# Patient Record
Sex: MALE | Race: WHITE | NOT HISPANIC OR LATINO | Employment: OTHER | URBAN - METROPOLITAN AREA
[De-identification: names, ages, dates, MRNs, and addresses within clinical notes are randomized per-mention and may not be internally consistent; named-entity substitution may affect disease eponyms.]

---

## 2017-05-24 ENCOUNTER — OFFICE VISIT (OUTPATIENT)
Dept: FAMILY MEDICINE CLINIC | Facility: CLINIC | Age: 82
End: 2017-05-24

## 2017-05-24 VITALS
HEART RATE: 64 BPM | TEMPERATURE: 98.4 F | OXYGEN SATURATION: 98 % | HEIGHT: 70 IN | DIASTOLIC BLOOD PRESSURE: 65 MMHG | SYSTOLIC BLOOD PRESSURE: 114 MMHG | BODY MASS INDEX: 29.35 KG/M2 | RESPIRATION RATE: 16 BRPM | WEIGHT: 205 LBS

## 2017-05-24 DIAGNOSIS — R05.9 COUGH: ICD-10-CM

## 2017-05-24 DIAGNOSIS — J06.9 ACUTE URI: Primary | ICD-10-CM

## 2017-05-24 PROCEDURE — 99214 OFFICE O/P EST MOD 30 MIN: CPT | Performed by: FAMILY MEDICINE

## 2017-05-24 PROCEDURE — 71020 XR CHEST PA AND LATERAL: CPT | Performed by: FAMILY MEDICINE

## 2017-05-24 RX ORDER — SULFAMETHOXAZOLE AND TRIMETHOPRIM 800; 160 MG/1; MG/1
1 TABLET ORAL 2 TIMES DAILY
Qty: 20 TABLET | Refills: 0 | Status: SHIPPED | OUTPATIENT
Start: 2017-05-24 | End: 2017-06-06 | Stop reason: SDUPTHER

## 2017-05-24 RX ORDER — DOXYCYCLINE HYCLATE 100 MG/1
CAPSULE ORAL
COMMUNITY
Start: 2017-05-19 | End: 2017-10-31

## 2017-05-30 ENCOUNTER — TELEPHONE (OUTPATIENT)
Dept: FAMILY MEDICINE CLINIC | Facility: CLINIC | Age: 82
End: 2017-05-30

## 2017-06-05 ENCOUNTER — TELEPHONE (OUTPATIENT)
Dept: FAMILY MEDICINE CLINIC | Facility: CLINIC | Age: 82
End: 2017-06-05

## 2017-06-05 DIAGNOSIS — J06.9 ACUTE URI: ICD-10-CM

## 2017-06-05 DIAGNOSIS — R05.9 COUGH: ICD-10-CM

## 2017-06-06 RX ORDER — SULFAMETHOXAZOLE AND TRIMETHOPRIM 800; 160 MG/1; MG/1
1 TABLET ORAL 2 TIMES DAILY
Qty: 20 TABLET | Refills: 0 | Status: SHIPPED | OUTPATIENT
Start: 2017-06-06 | End: 2017-10-31

## 2017-06-22 ENCOUNTER — HOSPITAL ENCOUNTER (EMERGENCY)
Facility: HOSPITAL | Age: 82
Discharge: HOME | End: 2017-06-22
Attending: EMERGENCY MEDICINE | Admitting: EMERGENCY MEDICINE

## 2017-06-22 ENCOUNTER — APPOINTMENT (OUTPATIENT)
Dept: CT IMAGING | Facility: HOSPITAL | Age: 82
End: 2017-06-22

## 2017-06-22 VITALS
RESPIRATION RATE: 16 BRPM | TEMPERATURE: 97.6 F | HEIGHT: 70 IN | DIASTOLIC BLOOD PRESSURE: 63 MMHG | BODY MASS INDEX: 27.92 KG/M2 | WEIGHT: 195 LBS | OXYGEN SATURATION: 95 % | HEART RATE: 72 BPM | SYSTOLIC BLOOD PRESSURE: 130 MMHG

## 2017-06-22 DIAGNOSIS — S00.93XA CONTUSION OF HEAD, UNSPECIFIED PART OF HEAD, INITIAL ENCOUNTER: Primary | ICD-10-CM

## 2017-06-22 DIAGNOSIS — S01.81XA FACIAL LACERATION, INITIAL ENCOUNTER: ICD-10-CM

## 2017-06-22 PROCEDURE — 99283 EMERGENCY DEPT VISIT LOW MDM: CPT

## 2017-06-22 PROCEDURE — 90471 IMMUNIZATION ADMIN: CPT | Performed by: NURSE PRACTITIONER

## 2017-06-22 PROCEDURE — 90715 TDAP VACCINE 7 YRS/> IM: CPT | Performed by: NURSE PRACTITIONER

## 2017-06-22 PROCEDURE — 70450 CT HEAD/BRAIN W/O DYE: CPT

## 2017-06-22 PROCEDURE — 25010000002 TDAP 5-2.5-18.5 LF-MCG/0.5 SUSPENSION: Performed by: NURSE PRACTITIONER

## 2017-06-22 RX ORDER — ASPIRIN 81 MG/1
81 TABLET, CHEWABLE ORAL DAILY
COMMUNITY

## 2017-06-22 RX ORDER — PHENOL 1.4 %
600 AEROSOL, SPRAY (ML) MUCOUS MEMBRANE DAILY
COMMUNITY

## 2017-06-22 RX ORDER — UBIDECARENONE 100 MG
100 CAPSULE ORAL DAILY
COMMUNITY

## 2017-06-22 RX ORDER — LIDOCAINE HYDROCHLORIDE AND EPINEPHRINE 10; 10 MG/ML; UG/ML
20 INJECTION, SOLUTION INFILTRATION; PERINEURAL ONCE
Status: COMPLETED | OUTPATIENT
Start: 2017-06-22 | End: 2017-06-22

## 2017-06-22 RX ORDER — MELATONIN
2000 DAILY
COMMUNITY

## 2017-06-22 RX ADMIN — LIDOCAINE HYDROCHLORIDE AND EPINEPHRINE 20 ML: 10; 10 INJECTION, SOLUTION INFILTRATION; PERINEURAL at 12:01

## 2017-06-22 RX ADMIN — TETANUS TOXOID, REDUCED DIPHTHERIA TOXOID AND ACELLULAR PERTUSSIS VACCINE, ADSORBED 0.5 ML: 5; 2.5; 8; 8; 2.5 SUSPENSION INTRAMUSCULAR at 11:41

## 2017-06-22 NOTE — ED PROVIDER NOTES
History    The patient presents to the ED after a golf ball ricocheted off the cart path and hit him upon the right superior orbit upon impact. The patient sustained a laceration upon impact but he denies sustaining any LOC. He is currently complaining of a mild diffuse HA but he has no other complaints.     On physical exam, AOx3, laceration right eyebrow, periorbital ecchymosis, no bony tenderness, Normal neuro    Negative Head CT    Lac repaired by NP    I supervised care provided by the midlevel provider.  We have discussed this patient's history, physical exam, and treatment plan. I have reviewed the note and personally saw and examined the patient and agree with the plan of care.    Documentation assistance provided by kasey Alvarez for .  Information recorded by the scribe was done at my direction and has been verified and validated by me.       Bienvenido Alvarez  06/22/17 0838       Tim Demarco MD  06/22/17 5267

## 2017-06-22 NOTE — DISCHARGE INSTRUCTIONS
Continue current home medications   Ice to face/eye  Monitor for signs of infection-redness,swelling,drainage, fever, chills  Have stitches removed in 5-7 days  Return to er for fever, chills, signs of infection, pain or any new or worsening symptoms

## 2017-06-22 NOTE — ED PROVIDER NOTES
EMERGENCY DEPARTMENT ENCOUNTER    CHIEF COMPLAINT  Chief Complaint: head injury  History given by: patient, family  History limited by: N/A  Room Number: 04/04  PMD: Dell Molina MD      HPI:  Pt is a 89 y.o. male who presents with head injury that he sustained earlier today. He reports that while he was playing golf, his golf ball bounced off of a cart and struck his head. He currently has mild headache. He also sustained a laceration to the right eyebrow. He denies loss of consciousness, vision changes, focal weakness, numbness, dizziness, confusion, nausea, vomiting, neck pain, and sustaining any other injury. He states that he went to [facility] earlier today and was referred to ED for further evaluation. Tetanus status is unknown. He is on 81mg ASA and no other blood thinner. Past Medical History of prostate cancer (recently finished radiation therapy), GERD, and HTN.     Duration: occurred earlier today  Timing: constant  Location: head  Radiation: none  Quality: pain  Intensity/Severity: moderate  Progression: unchanged  Associated Symptoms: mild headache, right eyebrow laceration  Aggravating Factors: none  Alleviating Factors: none  Previous Episodes: none  Treatment before arrival: Pt states that he went to [facility] earlier today and was referred to ED for further evaluation.     PAST MEDICAL HISTORY  Active Ambulatory Problems     Diagnosis Date Noted   • Bronchitis 11/11/2015   • Cough 11/11/2015   • Allergic urticaria    • Cellulitis of fifth finger, right    • Hypertension      Resolved Ambulatory Problems     Diagnosis Date Noted   • No Resolved Ambulatory Problems     Past Medical History:   Diagnosis Date   • Allergic urticaria    • Cellulitis of fifth finger, right    • Eye exam, routine 2015   • GERD (gastroesophageal reflux disease)    • Hx of bone density study 11/23/2015   • Hyperlipidemia    • Hypertension    • Macular degeneration (senile) of retina    • Screening for prostate cancer 2012       PAST  SURGICAL HISTORY  Past Surgical History:   Procedure Laterality Date   • COLON BIOPSY  12/2011   • COLONOSCOPY  12/14/2011   • CYST REMOVAL      from finger   • FRACTURE SURGERY Left     broken leg   • LUNG BIOPSY     • TONSILLECTOMY         FAMILY HISTORY  Family History   Problem Relation Age of Onset   • Cancer Father      prostate   • Cancer Brother      lung       SOCIAL HISTORY  Social History     Social History   • Marital status:      Spouse name: N/A   • Number of children: N/A   • Years of education: N/A     Occupational History   • Not on file.     Social History Main Topics   • Smoking status: Former Smoker   • Smokeless tobacco: Never Used   • Alcohol use Yes      Comment: moderate amt   • Drug use: No   • Sexual activity: Not on file     Other Topics Concern   • Not on file     Social History Narrative         ALLERGIES  Cephalosporins; Erythromycin; Keflex [cephalexin]; Levofloxacin; Penicillins; and Pneumococcal vaccines    REVIEW OF SYSTEMS  Review of Systems   Constitutional: Negative for chills and fever.   HENT: Negative for sore throat.    Eyes: Negative for visual disturbance.   Gastrointestinal: Negative for nausea and vomiting.   Genitourinary: Negative for dysuria.   Musculoskeletal: Negative for back pain and neck pain.   Skin: Positive for wound (right eyebrow laceration). Negative for rash.   Neurological: Positive for headaches (mild headache). Negative for dizziness, weakness and numbness.   Psychiatric/Behavioral: The patient is not nervous/anxious.        PHYSICAL EXAM  ED Triage Vitals   Temp Heart Rate Resp BP SpO2   06/22/17 1019 06/22/17 1019 06/22/17 1019 06/22/17 1053 06/22/17 1019   97.6 °F (36.4 °C) 66 18 130/63 95 % WNL       Physical Exam   Constitutional: He is oriented to person, place, and time and well-developed, well-nourished, and in no distress. No distress.   HENT:   Head: Normocephalic.   Mouth/Throat: Mucous membranes are normal.   Bruising around right eye    Eyes: EOM are normal. Pupils are equal, round, and reactive to light. No scleral icterus.   No EOM entrapment   Neck: Normal range of motion.   No c-spine tenderness   Cardiovascular: Normal rate, regular rhythm and normal heart sounds.    Pulmonary/Chest: Effort normal and breath sounds normal. No respiratory distress.   Musculoskeletal: Normal range of motion.   Neurological: He is alert and oriented to person, place, and time. He has normal motor skills and normal sensation.   No focal neuro deficits   Skin: Skin is warm and dry. Laceration (2cm laceration to right eyebrow) noted.   Psychiatric: Mood and affect normal.   Nursing note and vitals reviewed.      RADIOLOGY  CT Head Without Contrast      mpression:      1. Mild small vessel disease in the frontal periventricular white matter  and there is mild diffuse cerebral atrophy most pronounced in the  frontotemporal regions.  There are scattered calcified atherosclerotic  plaques in the cavernous segments of internal carotid arteries  bilaterally.  2. There is a small scalp hematoma and laceration overlying the anterior  inferior lateral right frontal bone in the right supraorbital scalp from  today's head trauma. The remainder of the head CT is within normal  limits with no acute skull fracture or intracranial hemorrhage  identified. Results were communicated to Thi Gutierrez, nurse  practitioner in the ER, taking care of the patient by telephone  06/22/2017 at 1145 AM.          CT head- no acute process, no fracture, no hemorrhage    I ordered the above noted radiological studies and reviewed the images on the PACS system.  Spoke with Dr. Mratínez (radiologist) regarding CT scan results        PROCEDURES    Laceration Repair  Date/Time: 6/22/2017 12:00 PM  Performed by: NICK GUTIERREZ  Authorized by: MEDARDO WALDRON   Consent: Verbal consent obtained.  Risks and benefits: risks, benefits and alternatives were discussed  Consent given by:  patient  Patient understanding: patient states understanding of the procedure being performed  Patient consent: the patient's understanding of the procedure matches consent given  Required items: required blood products, implants, devices, and special equipment available  Patient identity confirmed: verbally with patient and arm band  Body area: head/neck  Location details: right eyebrow  Laceration length: 2 cm  Contaminated: none.  Foreign bodies: no foreign bodies  Tendon involvement: none  Nerve involvement: none  Vascular damage: no  Anesthesia: local infiltration    Anesthesia:  Anesthesia: local infiltration  Local Anesthetic: lidocaine 1% with epinephrine   Anesthetic total: 2.5 mL  Sedation:  Patient sedated: no    Preparation: Patient was prepped and draped in the usual sterile fashion.  Irrigation solution: saline  Irrigation method: syringe  Amount of cleaning: standard  Debridement: none  Degree of undermining: none  Skin closure: 6-0 nylon  Number of sutures: 3  Technique: simple (interrupted)  Approximation: close  Approximation difficulty: simple  Dressing: antibiotic ointment and 4x4 sterile gauze  Patient tolerance: Patient tolerated the procedure well with no immediate complications            PROGRESS AND CONSULTS  11:04 AM- Ordered tdap since tetanus status is unknown.   12:00 PM- Rechecked pt. He is resting comfortably and is in no acute distress. Performed laceration repair. See procedure note for further details.  12:10 PM- Reviewed implications of results (including nothing acute indicated on CT head), diagnosis, meds, responsibility to follow up, warning signs and symptoms of possible worsening, potential complications and reasons to return to ER with patient and family.  Discussed all results and noted any abnormalities with patient and family.  Discussed absolute need to recheck abnormalities and condition with PMD. Instructed pt to keep laceration dry for 24 hours, then to clean site  "daily with soap and water, to apply antibiotic ointment, to monitor for signs of infection (including redness, drainage, swelling, and fevers), and to have sutures removed in 5 to 7 days. Advised pt to apply ice to face. Provided pt and family with head injury precautions.   Discussed plan for discharge, as there is no emergent indication for admission.  Pt and family are agreeable and understand need for follow up and repeat testing. They are aware that discharge does not mean that nothing is wrong but it indicates no emergency is present.  Pt is discharged with instructions to follow up with primary care doctor to have their blood pressure rechecked.   12:13 PM- Reviewed pt's history and workup with Dr. Demarco.  At bedside evaluation, they agree with the plan of care.      DIAGNOSIS  Final diagnoses:   Contusion of head, unspecified part of head, initial encounter   Facial laceration, initial encounter       FOLLOW UP   Dell Molina MD  [address] [address]  [phone]    Schedule an appointment as soon as possible for a visit        COURSE & MEDICAL DECISION MAKING  Pertinent Imaging studies that were ordered and reviewed are noted above.  Results were reviewed/discussed with the patient and family and they were also made aware of online assess.       MEDICATIONS GIVEN IN ER  Medications   Tdap (BOOSTRIX) injection 0.5 mL (0.5 mL Intramuscular Given 6/22/17 1141)   lidocaine-EPINEPHrine (XYLOCAINE W/EPI) 1 %-1:[id number] injection 20 mL (20 mL Injection Given by Other 6/22/17 1201)       /63  Pulse 72  Temp 97.6 °F (36.4 °C) (Tympanic)   Resp 16  Ht 70\" (177.8 cm)  Wt 195 lb (88.5 kg)  SpO2 95%  BMI 27.98 kg/m2      I personally reviewed the past medical history, past surgical history, social history, family history, current medications and allergies as they appear in this chart.  The scribe's note accurately reflects the work and decisions made by me.     I personally " scribed for TATUM Jeffrey on 6/22/2017 at 12:25 PM.  Electronically signed by Ju Lyman on 6/22/2017 at time 12:25 PM     Ju Lyman  06/22/17 1228       Ashlie Gutierrez, MAGEN  06/22/17 [id number]

## 2017-06-23 ENCOUNTER — TELEPHONE (OUTPATIENT)
Dept: SOCIAL WORK | Facility: HOSPITAL | Age: 82
End: 2017-06-23

## 2017-06-23 NOTE — TELEPHONE ENCOUNTER
ER F/U phone call:   Pt states that he is doing well. No other questions or concerns voiced at this time. Eve Lui RN

## 2017-06-29 ENCOUNTER — OFFICE VISIT (OUTPATIENT)
Dept: FAMILY MEDICINE CLINIC | Facility: CLINIC | Age: 82
End: 2017-06-29

## 2017-06-29 VITALS
HEIGHT: 70 IN | SYSTOLIC BLOOD PRESSURE: 134 MMHG | HEART RATE: 52 BPM | RESPIRATION RATE: 16 BRPM | OXYGEN SATURATION: 98 % | WEIGHT: 205 LBS | TEMPERATURE: 97.7 F | BODY MASS INDEX: 29.35 KG/M2 | DIASTOLIC BLOOD PRESSURE: 70 MMHG

## 2017-06-29 DIAGNOSIS — Z09 HOSPITAL DISCHARGE FOLLOW-UP: ICD-10-CM

## 2017-06-29 DIAGNOSIS — Z48.02 VISIT FOR SUTURE REMOVAL: Primary | ICD-10-CM

## 2017-06-29 PROCEDURE — 99214 OFFICE O/P EST MOD 30 MIN: CPT | Performed by: NURSE PRACTITIONER

## 2017-06-30 NOTE — PROGRESS NOTES
Subjective   Lavon Navarro is a 89 y.o. male.     History of Present Illness   Lavon Navarro 89 y.o. male presents today for Emergency Room follow up.  he was treated at [facility] for laceration to right eyebrow.  I reviewed all of the labs and diagnostic testing.  The patient's medications were not changed:  he does not have a follow up appointment with a specialist:    Hospital note as follows:     HPI:  Pt is a 89 y.o. male who presents with head injury that he sustained earlier today. He reports that while he was playing golf, his golf ball bounced off of a cart and struck his head. He currently has mild headache. He also sustained a laceration to the right eyebrow. He denies loss of consciousness, vision changes, focal weakness, numbness, dizziness, confusion, nausea, vomiting, neck pain, and sustaining any other injury. He states that he went to [facility] earlier today and was referred to ED for further evaluation. Tetanus status is unknown. He is on 81mg ASA and no other blood thinner. Past Medical History of prostate cancer (recently finished radiation therapy), GERD, and HTN. 11:04 AM- Ordered tdap since tetanus status is unknown.   12:00 PM- Rechecked pt. He is resting comfortably and is in no acute distress. Performed laceration repair. See procedure note for further details.  12:10 PM- Reviewed implications of results (including nothing acute indicated on CT head), diagnosis, meds, responsibility to follow up, warning signs and symptoms of possible worsening, potential complications and reasons to return to ER with patient and family. Discussed all results and noted any abnormalities with patient and family. Discussed absolute need to recheck abnormalities and condition with PMD. Instructed pt to keep laceration dry for 24 hours, then to clean site daily with soap and water, to apply antibiotic ointment, to monitor for signs of infection (including redness, drainage, swelling, and fevers), and to have  sutures removed in 5 to 7 days. Advised pt to apply ice to face. Provided pt and family with head injury precautions.   Discussed plan for discharge, as there is no emergent indication for admission. Pt and family are agreeable and understand need for follow up and repeat testing. They are aware that discharge does not mean that nothing is wrong but it indicates no emergency is present. Pt is discharged with instructions to follow up with primary care doctor to have their blood pressure rechecked.   12:13 PM- Reviewed pt's history and workup with Dr. Demarco. At bedside evaluation, they agree with the plan of care.    Patient presents today for suture removal.    The following portions of the patient's history were reviewed and updated as appropriate: allergies, current medications, past family history, past medical history, past social history, past surgical history and problem list.    Review of Systems   Constitutional: Negative for chills and fever.   Eyes: Negative for visual disturbance.   Skin: Positive for wound. Negative for color change.   Neurological: Negative for dizziness, light-headedness and headaches.       Objective   Physical Exam   Constitutional: He is oriented to person, place, and time. He appears well-developed and well-nourished.   Eyes: Conjunctivae, EOM and lids are normal. Pupils are equal, round, and reactive to light.       2 cm laceration to right eyebrow.  Wound edges well approximated.  No drainage noted.    Pulmonary/Chest: Effort normal.   Neurological: He is oriented to person, place, and time.   Skin: Skin is warm and dry.   Psychiatric: He has a normal mood and affect. His behavior is normal. Judgment and thought content normal.   Nursing note and vitals reviewed.      Assessment/Plan   Lavon was seen today for suture / staple removal.    Diagnoses and all orders for this visit:    Visit for suture removal    Hospital discharge follow-up        3 sutures removed.       Hospital  records reviewed with pt confirming HPI.

## 2017-09-25 ENCOUNTER — TELEPHONE (OUTPATIENT)
Dept: FAMILY MEDICINE CLINIC | Facility: CLINIC | Age: 82
End: 2017-09-25

## 2017-10-31 ENCOUNTER — OFFICE VISIT (OUTPATIENT)
Dept: SURGERY | Facility: CLINIC | Age: 82
End: 2017-10-31

## 2017-10-31 VITALS
TEMPERATURE: 98.1 F | WEIGHT: 207.4 LBS | BODY MASS INDEX: 29.69 KG/M2 | DIASTOLIC BLOOD PRESSURE: 60 MMHG | SYSTOLIC BLOOD PRESSURE: 120 MMHG | HEIGHT: 70 IN | HEART RATE: 68 BPM | OXYGEN SATURATION: 96 %

## 2017-10-31 DIAGNOSIS — K62.5 RECTUM BLEEDING: Primary | ICD-10-CM

## 2017-10-31 DIAGNOSIS — D50.9 IRON DEFICIENCY ANEMIA, UNSPECIFIED IRON DEFICIENCY ANEMIA TYPE: ICD-10-CM

## 2017-10-31 PROCEDURE — 99212 OFFICE O/P EST SF 10 MIN: CPT | Performed by: COLON & RECTAL SURGERY

## 2017-10-31 RX ORDER — SODIUM CHLORIDE, SODIUM LACTATE, POTASSIUM CHLORIDE, CALCIUM CHLORIDE 600; 310; 30; 20 MG/100ML; MG/100ML; MG/100ML; MG/100ML
30 INJECTION, SOLUTION INTRAVENOUS CONTINUOUS
Status: CANCELLED | OUTPATIENT
Start: 2017-11-09

## 2017-11-09 ENCOUNTER — HOSPITAL ENCOUNTER (OUTPATIENT)
Facility: HOSPITAL | Age: 82
Setting detail: HOSPITAL OUTPATIENT SURGERY
Discharge: HOME | End: 2017-11-09
Attending: COLON & RECTAL SURGERY | Admitting: COLON & RECTAL SURGERY

## 2017-11-09 ENCOUNTER — ANESTHESIA EVENT (OUTPATIENT)
Dept: GASTROENTEROLOGY | Facility: HOSPITAL | Age: 82
End: 2017-11-09

## 2017-11-09 ENCOUNTER — ANESTHESIA (OUTPATIENT)
Dept: GASTROENTEROLOGY | Facility: HOSPITAL | Age: 82
End: 2017-11-09

## 2017-11-09 VITALS
OXYGEN SATURATION: 98 % | HEART RATE: 55 BPM | SYSTOLIC BLOOD PRESSURE: 101 MMHG | DIASTOLIC BLOOD PRESSURE: 57 MMHG | BODY MASS INDEX: 28.77 KG/M2 | HEIGHT: 70 IN | WEIGHT: 201 LBS | RESPIRATION RATE: 16 BRPM | TEMPERATURE: 98 F

## 2017-11-09 DIAGNOSIS — K62.5 RECTUM BLEEDING: ICD-10-CM

## 2017-11-09 PROCEDURE — 45385 COLONOSCOPY W/LESION REMOVAL: CPT | Performed by: COLON & RECTAL SURGERY

## 2017-11-09 PROCEDURE — 45380 COLONOSCOPY AND BIOPSY: CPT | Performed by: COLON & RECTAL SURGERY

## 2017-11-09 PROCEDURE — 25010000002 FENTANYL CITRATE (PF) 100 MCG/2ML SOLUTION: Performed by: ANESTHESIOLOGY

## 2017-11-09 PROCEDURE — 25010000002 PROPOFOL 10 MG/ML EMULSION: Performed by: ANESTHESIOLOGY

## 2017-11-09 PROCEDURE — 88305 TISSUE EXAM BY PATHOLOGIST: CPT | Performed by: COLON & RECTAL SURGERY

## 2017-11-09 DEVICE — DEV CLIP ENDO RESOLUTION360 CONTRL ROT 235CM: Type: IMPLANTABLE DEVICE | Status: FUNCTIONAL

## 2017-11-09 RX ORDER — PROPOFOL 10 MG/ML
VIAL (ML) INTRAVENOUS AS NEEDED
Status: DISCONTINUED | OUTPATIENT
Start: 2017-11-09 | End: 2017-11-09

## 2017-11-09 RX ORDER — SODIUM CHLORIDE, SODIUM LACTATE, POTASSIUM CHLORIDE, CALCIUM CHLORIDE 600; 310; 30; 20 MG/100ML; MG/100ML; MG/100ML; MG/100ML
30 INJECTION, SOLUTION INTRAVENOUS CONTINUOUS
Status: DISCONTINUED | OUTPATIENT
Start: 2017-11-09 | End: 2017-11-09 | Stop reason: HOSPADM

## 2017-11-09 RX ORDER — FENTANYL CITRATE 50 UG/ML
INJECTION, SOLUTION INTRAMUSCULAR; INTRAVENOUS AS NEEDED
Status: DISCONTINUED | OUTPATIENT
Start: 2017-11-09 | End: 2017-11-09 | Stop reason: SURG

## 2017-11-09 RX ORDER — PROPOFOL 10 MG/ML
VIAL (ML) INTRAVENOUS AS NEEDED
Status: DISCONTINUED | OUTPATIENT
Start: 2017-11-09 | End: 2017-11-09 | Stop reason: SURG

## 2017-11-09 RX ORDER — PROPOFOL 10 MG/ML
VIAL (ML) INTRAVENOUS CONTINUOUS PRN
Status: DISCONTINUED | OUTPATIENT
Start: 2017-11-09 | End: 2017-11-09 | Stop reason: SURG

## 2017-11-09 RX ADMIN — PROPOFOL 160 MG: 10 INJECTION, EMULSION INTRAVENOUS at 10:41

## 2017-11-09 RX ADMIN — SODIUM CHLORIDE, POTASSIUM CHLORIDE, SODIUM LACTATE AND CALCIUM CHLORIDE 30 ML/HR: 600; 310; 30; 20 INJECTION, SOLUTION INTRAVENOUS at 09:46

## 2017-11-09 RX ADMIN — FENTANYL CITRATE 50 MCG: 50 INJECTION INTRAMUSCULAR; INTRAVENOUS at 10:41

## 2017-11-09 RX ADMIN — PROPOFOL 120 MCG/KG/MIN: 10 INJECTION, EMULSION INTRAVENOUS at 10:43

## 2017-11-09 NOTE — H&P (VIEW-ONLY)
"Lavon Navarro is a 89 y.o. male in for follow up of Rectum bleeding    Iron deficiency anemia, unspecified iron deficiency anemia type    Dx with prostate ca.  xrt + bcg finished June   bm hard  occas rb with hard stool.    Tried stool softner fixes rb  Fiber  -met   W/ miralax  Alone,  bm too loose  Tried prep H - helps  At nephrologist and found hgb 9.7  started fe 3x/wk  Just started  slightly low fe level    /60 (BP Location: Left arm, Patient Position: Sitting, Cuff Size: Adult)  Pulse 68  Temp 98.1 °F (36.7 °C) (Oral)   Ht 70\" (177.8 cm)  Wt 207 lb 6.4 oz (94.1 kg)  SpO2 96%  BMI 29.76 kg/m2  Body mass index is 29.76 kg/(m^2).      PE:  Physical Exam   Constitutional: He appears well-developed. No distress.   HENT:   Head: Normocephalic and atraumatic.   Abdominal: Soft. He exhibits no distension.   Musculoskeletal: Normal range of motion.   Neurological: He is alert.   Psychiatric: Thought content normal.         Assessment:   1. Rectum bleeding    2. Iron deficiency anemia, unspecified iron deficiency anemia type         Plan:    Cy            "

## 2017-11-09 NOTE — ANESTHESIA PREPROCEDURE EVALUATION
Anesthesia Evaluation     Patient summary reviewed   no history of anesthetic complications:  NPO Solid Status: > 8 hours  NPO Liquid Status: > 8 hours     Airway   Mallampati: I  TM distance: >3 FB  Neck ROM: full  no difficulty expected  Dental      Pulmonary     breath sounds clear to auscultation  Cardiovascular   Exercise tolerance: good (4-7 METS)    Rhythm: regular  Rate: normal    (+) hypertension, hyperlipidemia      Neuro/Psych  GI/Hepatic/Renal/Endo    (+)  GERD,     Musculoskeletal     Abdominal    Substance History      OB/GYN          Other                                        Anesthesia Plan    ASA 3     MAC   total IV anesthesia  intravenous induction   Anesthetic plan and risks discussed with patient.

## 2017-11-09 NOTE — PLAN OF CARE
Problem: Patient Care Overview (Adult)  Goal: Adult Individualization and Mutuality  Outcome: Ongoing (interventions implemented as appropriate)    11/09/17 0937   Individualization   Patient Specific Interventions denies       Goal: Discharge Needs Assessment    11/09/17 0937   Discharge Needs Assessment   Concerns To Be Addressed no discharge needs identified   Discharge Disposition home or self-care   Self-Care   Equipment Currently Used at Home none         Problem: GI Endoscopy (Adult)  Goal: Signs and Symptoms of Listed Potential Problems Will be Absent or Manageable (GI Endoscopy)  Outcome: Ongoing (interventions implemented as appropriate)    11/09/17 0937   GI Endoscopy   Problems Assessed (GI Endoscopy) pain;bleeding;fluid imbalance;hypoxia/hypoxemia   Problems Present (GI Endoscopy) none

## 2017-11-09 NOTE — ANESTHESIA POSTPROCEDURE EVALUATION
Patient: Lavon Navarro    Procedure Summary     Date Anesthesia Start Anesthesia Stop Room / Location    11/09/17 1039 1113  [facility] /  [facility]       Procedure Diagnosis Surgeon Provider    COLONOSCOPY TO CECUM WITH COLD POLYPECTOMY, HOT SNARE POLYPECTOMY (N/A ) Rectum bleeding  (Rectum bleeding [K62.5]) MD Viridiana Deleon MD          Anesthesia Type: MAC  Last vitals  BP   101/57 (11/09/17 1130)   Temp   36.7 °C (98 °F) (11/09/17 1130)   Pulse   55 (11/09/17 1130)   Resp   16 (11/09/17 1130)     SpO2   98 % (11/09/17 1130)     Post Anesthesia Care and Evaluation    Patient location during evaluation: bedside  Patient participation: complete - patient participated  Level of consciousness: awake  Pain management: adequate  Anesthetic complications: No anesthetic complications    Cardiovascular status: acceptable  Respiratory status: acceptable  Hydration status: acceptable

## 2017-11-09 NOTE — DISCHARGE INSTRUCTIONS
For the next 24 hours patient needs to be with a responsible adult.    For 24 hours DO NOT drive, operate machinery, appliances, drink alcohol, make important decisions or sign legal documents.    Start with a light or bland diet and advance to regular diet as tolerated.    Follow recommendations on procedure report provided by your doctor.    Call Dr Bustos for problems [phone]    Problems may include but not limited to: large amounts of bleeding, trouble breathing, repeated vomiting, severe unrelieved pain, fever or chills.

## 2017-11-10 LAB
CYTO UR: NORMAL
LAB AP CASE REPORT: NORMAL
Lab: NORMAL
PATH REPORT.FINAL DX SPEC: NORMAL
PATH REPORT.GROSS SPEC: NORMAL

## 2018-08-27 ENCOUNTER — OFFICE VISIT (OUTPATIENT)
Dept: FAMILY MEDICINE CLINIC | Facility: CLINIC | Age: 83
End: 2018-08-27

## 2018-08-27 VITALS
OXYGEN SATURATION: 99 % | WEIGHT: 217 LBS | SYSTOLIC BLOOD PRESSURE: 145 MMHG | HEIGHT: 70 IN | HEART RATE: 54 BPM | RESPIRATION RATE: 16 BRPM | BODY MASS INDEX: 31.07 KG/M2 | TEMPERATURE: 97.9 F | DIASTOLIC BLOOD PRESSURE: 66 MMHG

## 2018-08-27 DIAGNOSIS — M70.22 OLECRANON BURSITIS OF LEFT ELBOW: ICD-10-CM

## 2018-08-27 DIAGNOSIS — R26.89 BALANCE PROBLEM: ICD-10-CM

## 2018-08-27 DIAGNOSIS — R42 DIZZINESS: Primary | ICD-10-CM

## 2018-08-27 DIAGNOSIS — E78.5 HYPERLIPIDEMIA, UNSPECIFIED HYPERLIPIDEMIA TYPE: ICD-10-CM

## 2018-08-27 DIAGNOSIS — D50.9 IRON DEFICIENCY ANEMIA, UNSPECIFIED IRON DEFICIENCY ANEMIA TYPE: ICD-10-CM

## 2018-08-27 PROCEDURE — 99214 OFFICE O/P EST MOD 30 MIN: CPT | Performed by: NURSE PRACTITIONER

## 2018-08-27 RX ORDER — PREDNISONE 20 MG/1
20 TABLET ORAL 2 TIMES DAILY
Qty: 14 TABLET | Refills: 0 | Status: SHIPPED | OUTPATIENT
Start: 2018-08-27 | End: 2018-09-03

## 2018-08-27 NOTE — PROGRESS NOTES
Subjective   Lavon Navarro is a 89 y.o. male.     History of Present Illness   Lavon Navarro 89 y.o. male who presents for evaluation of dizziness. The onset of symptoms were a few days ago and are waxing and waning. The attacks occur daily and last several  minutes. Positions that worsen symptoms: standing up and walking. Previous workup/treatments: none. Associated ear symptoms: none. Associated CNS symptoms: none. He denies synscopy, tinnitus, asymmetrical movements, ataxia, blackouts, blindness, parathesis, hearing loss, memory loss, fainting, headache, involuntary movements, sensory loss, motor loss, eye pain, ear pain and ear discharge.   Recent infections: none. Head trauma: denied.  Has had vertigo before but states this is different. He does not feel that the room is spinning, he feels that he has difficulty walking without swaying.  He denies feeling lightheaded or as if he might black out.  He denies ear pressure, congestion or sinus pain.        Patient was found to have some anemia at last visit with nephrologist. He was started on iron and sent for colonoscopy.  He has not had CBC rechecked since.        Patient also reports swelling to left elbow for several days.  He denies injury. States it is not painful, red or warm.          The following portions of the patient's history were reviewed and updated as appropriate: allergies, current medications, past family history, past medical history, past social history, past surgical history and problem list.    Review of Systems   Constitutional: Negative for fatigue.   HENT: Negative for congestion, ear discharge, ear pain, sinus pain, sinus pressure and tinnitus.    Respiratory: Negative for cough and shortness of breath.    Cardiovascular: Negative for chest pain and palpitations.   Musculoskeletal: Positive for joint swelling. Negative for arthralgias.   Skin: Negative for rash.   Neurological: Positive for dizziness. Negative for tremors,  seizures, syncope, facial asymmetry, speech difficulty, light-headedness, numbness and headaches.   Psychiatric/Behavioral: Negative for dysphoric mood and sleep disturbance. The patient is not nervous/anxious.        Objective   Physical Exam   Constitutional: He is oriented to person, place, and time. He appears well-developed and well-nourished.   HENT:   Right Ear: Tympanic membrane, external ear and ear canal normal.   Left Ear: Tympanic membrane, external ear and ear canal normal.   Nose: Right sinus exhibits no maxillary sinus tenderness and no frontal sinus tenderness. Left sinus exhibits no maxillary sinus tenderness and no frontal sinus tenderness.   Mouth/Throat: Uvula is midline and oropharynx is clear and moist.   Eyes: Pupils are equal, round, and reactive to light. Conjunctivae, EOM and lids are normal.   Cardiovascular: Normal rate and regular rhythm.    Pulmonary/Chest: Effort normal and breath sounds normal.   Musculoskeletal:        Left elbow: He exhibits swelling. He exhibits normal range of motion and no laceration. No tenderness found. No radial head, no medial epicondyle, no lateral epicondyle and no olecranon process tenderness noted.   Neurological: He is oriented to person, place, and time.   Skin: Skin is warm and dry.   Psychiatric: He has a normal mood and affect. His behavior is normal. Judgment and thought content normal.   Nursing note and vitals reviewed.      Assessment/Plan   Lavon was seen today for dizziness and mass.    Diagnoses and all orders for this visit:    Dizziness  -     Cancel: CBC & Differential  -     Cancel: Basic metabolic panel  -     Ambulatory Referral to Physical Therapy Evaluate and treat  -     Comprehensive metabolic panel  -     Lipid panel  -     CBC and Differential    Iron deficiency anemia, unspecified iron deficiency anemia type  -     Cancel: CBC & Differential  -     Cancel: Basic metabolic panel  -     Comprehensive metabolic panel  -     Lipid  panel  -     CBC and Differential    Balance problem  -     Ambulatory Referral to Physical Therapy Evaluate and treat    Hyperlipidemia, unspecified hyperlipidemia type  -     Comprehensive metabolic panel  -     Lipid panel  -     CBC and Differential    Olecranon bursitis of left elbow  -     predniSONE (DELTASONE) 20 MG tablet; Take 1 tablet by mouth 2 (Two) Times a Day for 7 days.

## 2018-09-10 ENCOUNTER — TREATMENT (OUTPATIENT)
Dept: PHYSICAL THERAPY | Facility: CLINIC | Age: 83
End: 2018-09-10

## 2018-09-10 DIAGNOSIS — R26.81 UNSTABLE GAIT: ICD-10-CM

## 2018-09-10 DIAGNOSIS — H81.10 BPPV (BENIGN PAROXYSMAL POSITIONAL VERTIGO), UNSPECIFIED LATERALITY: Primary | ICD-10-CM

## 2018-09-10 PROCEDURE — 95992 CANALITH REPOSITIONING PROC: CPT | Performed by: PHYSICAL THERAPIST

## 2018-09-10 PROCEDURE — 97162 PT EVAL MOD COMPLEX 30 MIN: CPT | Performed by: PHYSICAL THERAPIST

## 2018-09-10 NOTE — PROGRESS NOTES
Physical Therapy Initial Evaluation and Plan of Care    Patient: Lavon Navarro   : 1924  Diagnosis/ICD-10 Code:  BPPV (benign paroxysmal positional vertigo), unspecified laterality [H81.10]  Referring practitioner: Brigette Diaz,*    Subjective Evaluation    History of Present Illness  Mechanism of injury: Woke one morning (18) got up to used the bathroom and went to the left side trying to walk to the bathroom.  The pt's daughter wanted him to the ER and took him to [facility] and had a CT scan & MRI.  Clear on both.  Pt reports that he has had no symptoms since.    Pt reports that morning he woke, he got right up out of bed.  He reports no symptoms occurring again.      Was referred by MAGEN Butterfield.    Comorbidities:   Prostrate Cancer      Occupation:  Retired -   Activities:  Play Golf 1x wk  PLOF: Independent  Medical Hx Reviewed.      Quality of life: good    Diagnostic Tests  MRI studies: normal  CT scan: normal             Objective       Assessment & Plan     Assessment  Impairments: activity intolerance and impaired balance  Assessment details: Pt presents to PT with symptoms consistent with BPPV.  Pt would benefit from skilled PT intervention to address the deficits noted.     Prognosis: good  Prognosis details:     SHORT TERM GOALS: Time for Goal: 2 weeks    1.  Pt reports that understand the information about BPPV and the treatment.    LONG TERM GOALS: Time for Goal: 1 months  1.  Pt to report absence of vertigo symptoms with all ADL's, IADL's, household, recreational and community activities, including all motions and positions.   Functional Limitations: moving in bed and reaching overhead  Plan  Therapy options: will be seen for skilled physical therapy services  Other planned modality interventions: BPPV Treatment;  Vestibular Strengthening  Planned therapy interventions: neuromuscular re-education  Frequency: 1-2x.  Duration in weeks:  4  Treatment plan discussed with: patient  Plan details: If no improvement is seen with BPPV is seen, then an appropriate vestibular exercise program will be started.          Manual Therapy:         mins  44522;  Therapeutic Exercise:         mins  37552;     Neuromuscular Michelle:        mins  09942;    Therapeutic Activity:          mins  53095;     Gait Training:           mins  74008;     Ultrasound:          mins  22441;    Electrical Stimulation:         mins  12868 ( );  Dry Needling          mins self-pay  BPPV Correction   10   mins    Timed Treatment:   10   mins   Total Treatment:     60   mins    PT SIGNATURE: YANET Atkins Lic #[id number]    DATE TREATMENT INITIATED: 9-10-18    Medicare Initial Certification  Certification Period: 12-9-18  I certify that the therapy services are furnished while this patient is under my care.  The services outlined above are required by this patient, and will be reviewed every 90 days.     PHYSICIAN: Brigette Diaz APRN      DATE:     Please sign and return via fax to [phone].. Thank you, [facility].

## 2018-09-13 PROCEDURE — G8982 BODY POS GOAL STATUS: HCPCS | Performed by: PHYSICAL THERAPIST

## 2018-09-13 PROCEDURE — G8981 BODY POS CURRENT STATUS: HCPCS | Performed by: PHYSICAL THERAPIST

## 2018-09-17 ENCOUNTER — OFFICE VISIT (OUTPATIENT)
Dept: FAMILY MEDICINE CLINIC | Facility: CLINIC | Age: 83
End: 2018-09-17

## 2018-09-17 ENCOUNTER — TREATMENT (OUTPATIENT)
Dept: PHYSICAL THERAPY | Facility: CLINIC | Age: 83
End: 2018-09-17

## 2018-09-17 VITALS
WEIGHT: 213 LBS | HEART RATE: 57 BPM | HEIGHT: 70 IN | BODY MASS INDEX: 30.49 KG/M2 | SYSTOLIC BLOOD PRESSURE: 98 MMHG | OXYGEN SATURATION: 97 % | TEMPERATURE: 97.8 F | DIASTOLIC BLOOD PRESSURE: 60 MMHG | RESPIRATION RATE: 16 BRPM

## 2018-09-17 DIAGNOSIS — R42 DIZZINESS: ICD-10-CM

## 2018-09-17 DIAGNOSIS — H81.10 BPPV (BENIGN PAROXYSMAL POSITIONAL VERTIGO), UNSPECIFIED LATERALITY: Primary | ICD-10-CM

## 2018-09-17 DIAGNOSIS — R26.81 UNSTABLE GAIT: ICD-10-CM

## 2018-09-17 DIAGNOSIS — I15.8 OTHER SECONDARY HYPERTENSION: ICD-10-CM

## 2018-09-17 DIAGNOSIS — N18.30 CKD (CHRONIC KIDNEY DISEASE) STAGE 3, GFR 30-59 ML/MIN (HCC): Primary | ICD-10-CM

## 2018-09-17 DIAGNOSIS — C61 PROSTATE CANCER (HCC): ICD-10-CM

## 2018-09-17 PROCEDURE — 95992 CANALITH REPOSITIONING PROC: CPT | Performed by: PHYSICAL THERAPIST

## 2018-09-17 PROCEDURE — 99214 OFFICE O/P EST MOD 30 MIN: CPT | Performed by: FAMILY MEDICINE

## 2018-09-17 NOTE — PATIENT INSTRUCTIONS
Exercise 30 minutes most days of the week  Sleep 6-8 hours each night if possible  Low fat, low cholesterol diet   we discussed prescribed medications and how to take them   make sure you get results of any labs/studies ordered today  Low glycemic index diet   See me 3 months  Sooner if needed

## 2018-09-17 NOTE — PROGRESS NOTES
Subjective   Chief Complaint:   Chief Complaint   Patient presents with   • Dizziness     Hospital Follow up         History of Present Illness patient was seen last week by Brigette for vertigo.  He was admitted to [facility] soon thereafter and was admitted for about 2-3 days.  He had a CT scan of his head MRI scan of his head.  His diagnosis on discharge was labyrinthitis and he was sent to physical therapy for treatment.physical therapy helped him.  I reviewed the chart from [facility].  He had a CT scan of his head and an MRI scan of his head which did not show anything acute.  His labs look good.  His blood pressure today is 98/60.  I reviewed his medications.  He has labs that show CK D3.  And a GFR of 39.  And a BUN of 34 and a creatinine 1.84.  He is alert and oriented.  Negative exam.  He follows closely with the renal doctors.  Several tested [facility].  He is given a still continue follow-up with renal and cardiology.  He is alert and oriented.  His medication list.            Lavon Navarro 89 y.o. male who presents today for Medical Management of the below listed issues and medication refills.    ICD-10-CM ICD-9-CM   1. CKD (chronic kidney disease) stage 3, GFR 30-59 ml/min N18.3 585.3   2. Other secondary hypertension I15.8 405.99   3. Prostate cancer (CMS/[facility]) C61 185   4. Dizziness R42 780.4        he has a problem list of   Patient Active Problem List   Diagnosis   • Bronchitis   • Cough   • Allergic urticaria   • Cellulitis of fifth finger, right   • Hypertension   • Rectum bleeding   .  Since the last visit, he has overall felt well.  he has been compliant with   Current Outpatient Prescriptions:   •  aspirin 81 MG chewable tablet, Chew 81 mg Daily., Disp: , Rfl:   •  atenolol (TENORMIN) 25 MG tablet, Take 12.5 mg by mouth daily., Disp: , Rfl:   •  atorvastatin (LIPITOR) 10 MG tablet, Take 20 mg by mouth daily., Disp: , Rfl:   •  calcium carbonate (OS-BARBIE) 600 MG tablet, Take 600 mg  "by mouth Daily., Disp: , Rfl:   •  cholecalciferol (VITAMIN D3) 1000 UNITS tablet, Take 2,000 Units by mouth Daily., Disp: , Rfl:   •  coenzyme Q10 100 MG capsule, Take 100 mg by mouth Daily., Disp: , Rfl:   •  Multiple Vitamins-Minerals (EYE VITAMINS & MINERALS) tablet, Take  by mouth., Disp: , Rfl:   •  Probiotic Product (PROBIOTIC ADVANCED PO), Take  by mouth., Disp: , Rfl: .  he denies medication side effects.    All of the chronic condition(s) listed above are stable w/o issues.    BP 98/60   Pulse 57   Temp 97.8 °F (36.6 °C)   Resp 16   Ht 177.8 cm (70\")   Wt 96.6 kg (213 lb)   SpO2 97%   BMI 30.56 kg/m²     Results for orders placed or performed during the hospital encounter of 11/09/17   Tissue Pathology Exam - Polyp, Large Intestine, Transverse Colon   Result Value Ref Range    Case Report       Surgical Pathology Report                         Case: [id number]                                  Authorizing Provider:  Leelee Bustos MD         Collected:           11/09/2017 10:49 AM          Ordering Location:     [facility]  Received:            11/09/2017 12:33 PM                                 ENDO SUITES                                                                  Pathologist:           Shlomo Pereira MD                                                        Specimen:    Large Intestine, Transverse Colon, TRANSVERSE COLON POLYPS                                 Final Diagnosis       1. 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    ALFONSO/pkm    CPT CODES:  1. 37500        Gross Description       Received in formalin labeled \"large intestine, transverse colon polyps\" is a 0.8 x 0.5 x 0.1 cm aggregate of tan pieces of tissue. The specimens are submitted all in block 1A.  CC/USO/TDJ/th       Microscopic Description       Performed, incorporated in diagnosis.      Embedded Images               The following portions of the patient's " history were reviewed and updated as appropriate: allergies, current medications, past family history, past medical history, past social history, past surgical history and problem list.    Review of Systems   Constitutional: Positive for fatigue. Negative for appetite change and fever.   HENT: Negative for sinus pain and sinus pressure.    Eyes: Negative for visual disturbance.   Respiratory: Negative for chest tightness and shortness of breath.    Cardiovascular: Negative for chest pain.   Gastrointestinal: Positive for abdominal pain.   Genitourinary: Negative for decreased urine volume and urgency.   Musculoskeletal: Negative for arthralgias and back pain.   Skin: Negative for rash.   Neurological: Negative for dizziness, tremors and headaches.   Psychiatric/Behavioral: Negative for confusion.       Objective   Physical Exam   Constitutional: He is oriented to person, place, and time. He appears well-developed and well-nourished. No distress.   HENT:   Head: Normocephalic.   Right Ear: External ear normal.   Left Ear: External ear normal.   Nose: Nose normal.   Mouth/Throat: Oropharynx is clear and moist.   Eyes: Pupils are equal, round, and reactive to light. EOM are normal.   Neck: Normal range of motion. No tracheal deviation present. No thyromegaly present.   Cardiovascular: Normal rate and regular rhythm.    No murmur heard.  Pulmonary/Chest: Effort normal and breath sounds normal. No respiratory distress. He has no wheezes. He has no rales.   Abdominal: Bowel sounds are normal. There is no tenderness.   Musculoskeletal: Normal range of motion. He exhibits no edema.   Lymphadenopathy:     He has no cervical adenopathy.   Neurological: He is alert and oriented to person, place, and time.   Skin: Skin is warm and dry. No rash noted.   Psychiatric: He has a normal mood and affect. His behavior is normal. Judgment and thought content normal.   Nursing note and vitals reviewed.      Assessment/Plan   Lavon was  seen today for dizziness.    Diagnoses and all orders for this visit:    CKD (chronic kidney disease) stage 3, GFR 30-59 ml/min    Other secondary hypertension    Prostate cancer (CMS/HCC)    Dizziness

## 2018-09-17 NOTE — PROGRESS NOTES
Physical Therapy Daily Progress Note  Visit: 2    Lavon Navarro reports: I am still feeling it when I roll over to the right.      Subjective     Objective   See Exercise, Manual, and Modality Logs for complete treatment.       Assessment & Plan     Assessment  Assessment details: The pt's symptoms continue to show (L) side BPPV symptoms.  Rx of the (L) side was performed with some symptoms provoked.      Plan  Plan details: Follow-up in a week, pt is unable to attend anymore this week.                   Manual Therapy:         mins  68827;  Therapeutic Exercise:         mins  57249;     Neuromuscular Michelle:        mins  99844;    Therapeutic Activity:          mins  30796;     Gait Training:           mins  64448;     Ultrasound:          mins  45116;    Electrical Stimulation:         mins  56023 ( );  Dry Needling          mins self-pay  BPPV Correction    15   mins      Timed Treatment:   15   mins   Total Treatment:     35   mins    Shlomo Jackson PT  [place] Lic. # [id number]  Physical Therapist

## 2018-09-26 ENCOUNTER — TREATMENT (OUTPATIENT)
Dept: PHYSICAL THERAPY | Facility: CLINIC | Age: 83
End: 2018-09-26

## 2018-09-26 DIAGNOSIS — R26.81 UNSTABLE GAIT: ICD-10-CM

## 2018-09-26 DIAGNOSIS — H81.10 BPPV (BENIGN PAROXYSMAL POSITIONAL VERTIGO), UNSPECIFIED LATERALITY: Primary | ICD-10-CM

## 2018-09-26 PROCEDURE — 95992 CANALITH REPOSITIONING PROC: CPT | Performed by: PHYSICAL THERAPIST

## 2018-09-26 PROCEDURE — 97112 NEUROMUSCULAR REEDUCATION: CPT | Performed by: PHYSICAL THERAPIST

## 2018-09-26 NOTE — PROGRESS NOTES
Physical Therapy Daily Progress Note  Visit: 3    Lavon Blakeid reports: I felt dizziness when I left last time but overall this week I have felt better.      Subjective     Objective   See Exercise, Manual, and Modality Logs for complete treatment.       Assessment & Plan     Assessment  Assessment details: The pt's nystagmus has shortened to 5 to 10 seconds occurrence but the intensity is still the same, overall showing improvement.  Because of the continued symptoms BPPV on the (L), will start Epley at home.      Plan  Plan details: Follow up in one week.                   Manual Therapy:         mins  76520;  Therapeutic Exercise:         mins  56273;     Neuromuscular Michelle:    15    mins  92601;    Therapeutic Activity:          mins  52788;     Gait Training:           mins  51043;     Ultrasound:          mins  28083;    Electrical Stimulation:         mins  78868 ( );  Dry Needling          mins self-pay  BPPV Correction    10   mins      Timed Treatment:   25   mins   Total Treatment:     45   mins    Shlomo Jackson PT  [place] Lic. # [id number]  Physical Therapist

## 2018-10-04 ENCOUNTER — TREATMENT (OUTPATIENT)
Dept: PHYSICAL THERAPY | Facility: CLINIC | Age: 83
End: 2018-10-04

## 2018-10-04 DIAGNOSIS — H81.10 BPPV (BENIGN PAROXYSMAL POSITIONAL VERTIGO), UNSPECIFIED LATERALITY: Primary | ICD-10-CM

## 2018-10-04 DIAGNOSIS — R26.81 UNSTABLE GAIT: ICD-10-CM

## 2018-10-04 PROCEDURE — G8982 BODY POS GOAL STATUS: HCPCS | Performed by: PHYSICAL THERAPIST

## 2018-10-04 PROCEDURE — 95992 CANALITH REPOSITIONING PROC: CPT | Performed by: PHYSICAL THERAPIST

## 2018-10-04 PROCEDURE — G8981 BODY POS CURRENT STATUS: HCPCS | Performed by: PHYSICAL THERAPIST

## 2018-10-04 PROCEDURE — G8983 BODY POS D/C STATUS: HCPCS | Performed by: PHYSICAL THERAPIST

## 2018-10-04 NOTE — PROGRESS NOTES
Physical Therapy Daily Progress Note / Discharge Note  Initial Visit: 9-10-18    Total Visits: 4    Lavon Navarro reports: I am feeling much better, I actually played 18 holes of golf this week.  I feel like my dizziness is gone.  The pt reports that he feels weak, but that is from the hormones I am on because of the Cancer Rx.     Subjective     Objective   See Exercise, Manual, and Modality Logs for complete treatment.       Assessment & Plan     Assessment  Assessment details: Pt no longer warrants skilled care for vertigo.  The pt has a trace presence of (L) side nystagmus w/ the [place] but no symptoms, much improved from the start of Rx.  The pt is able to do activities that he wants without limitation.        Plan  Plan details: If symptoms return the pt is to return to PT for further Rx.  DC to [facility].                   Manual Therapy:         mins  86341;  Therapeutic Exercise:         mins  76012;     Neuromuscular Michelle:        mins  50099;    Therapeutic Activity:          mins  24890;     Gait Training:           mins  78736;     Ultrasound:          mins  90737;    Electrical Stimulation:         mins  08240 ( );  Dry Needling          mins self-pay  BPPV Correction   15   mins      Timed Treatment:   15   mins   Total Treatment:     35   mins    Shlomo Jackson, PT  [place] Lic. # [id number]  Physical Therapist

## 2020-06-22 ENCOUNTER — TELEPHONE (OUTPATIENT)
Dept: SURGERY | Facility: CLINIC | Age: 85
End: 2020-06-22

## 2020-06-23 NOTE — TELEPHONE ENCOUNTER
Pt says he has been using Miralax Qd and it helps with constipation. Pt says bottle says to use for only 7 days. Told pt ok to use indefinitely.

## 2021-01-13 ENCOUNTER — IMMUNIZATION (OUTPATIENT)
Dept: VACCINE CLINIC | Facility: HOSPITAL | Age: 86
End: 2021-01-13

## 2021-01-13 PROCEDURE — 91300 HC SARSCOV02 VAC 30MCG/0.3ML IM: CPT | Performed by: INTERNAL MEDICINE

## 2021-01-13 PROCEDURE — 0001A: CPT | Performed by: INTERNAL MEDICINE

## 2021-02-03 ENCOUNTER — IMMUNIZATION (OUTPATIENT)
Dept: VACCINE CLINIC | Facility: HOSPITAL | Age: 86
End: 2021-02-03

## 2021-02-03 PROCEDURE — 91300 HC SARSCOV02 VAC 30MCG/0.3ML IM: CPT | Performed by: INTERNAL MEDICINE

## 2021-02-03 PROCEDURE — 0002A: CPT | Performed by: INTERNAL MEDICINE

## 2021-04-26 ENCOUNTER — TELEPHONE (OUTPATIENT)
Dept: FAMILY MEDICINE CLINIC | Facility: CLINIC | Age: 86
End: 2021-04-26

## 2021-04-26 NOTE — TELEPHONE ENCOUNTER
Caller: JIAN    Relationship: PATIENTS DAUGHTER    Best call back number:   OK TO LEAVE A MESSAGE IF THEY DO NOT ANSWER.     What is the best time to reach you: ANYTIME    Who are you requesting to speak with (clinical staff, provider,  specific staff member):     Do you know the name of the person who called:     What was the call regarding: PATIENTS DAUGHTER J[place] AND WANTS TO COME INTO THE OFFICE WITH THE PATIENT TO ADD HERSELF TO THE  [facility] FOR SO THAT SHE CAN HELP WITH HER FATHERS AFFAIRS.  JIAN IS CALLING IN STATING THAT PATIENT HAS SOME OMEPRAZOLE THAT HE WANTS TO TAKE FOR ACID REFLUX.  JIAN STATES THAT HE CAN TAKE THIS FOR 14 DAYS BUT IF HE NEEDS THIS FOR LONGER WHAT SHOULD HE DO.  SHE WANTS TO BE CALLED BACK TO DISCUSS THIS WITH SOMEONE.  PATIENT IS ON OTHER MEDICATIONS AND SHE WANTS TO TALK TO SOMEONE TO SEE WHAT MEDICATIONS WOULD BE GOOD FOR HIM TO TAKE WITH THE OTHER MEDICATIONS THAT HE IS CURRENTLY TAKING.      Do you require a callback: YES

## 2022-06-13 ENCOUNTER — OFFICE (OUTPATIENT)
Dept: URBAN - METROPOLITAN AREA CLINIC 66 | Facility: CLINIC | Age: 87
End: 2022-06-13

## 2022-06-13 VITALS
HEART RATE: 55 BPM | SYSTOLIC BLOOD PRESSURE: 148 MMHG | HEIGHT: 70 IN | WEIGHT: 221 LBS | DIASTOLIC BLOOD PRESSURE: 54 MMHG

## 2022-06-13 DIAGNOSIS — R19.7 DIARRHEA, UNSPECIFIED: ICD-10-CM

## 2022-06-13 DIAGNOSIS — K59.00 CONSTIPATION, UNSPECIFIED: ICD-10-CM

## 2022-06-13 PROCEDURE — 99202 OFFICE O/P NEW SF 15 MIN: CPT | Performed by: NURSE PRACTITIONER

## 2022-10-19 ENCOUNTER — APPOINTMENT (OUTPATIENT)
Dept: CT IMAGING | Facility: HOSPITAL | Age: 87
End: 2022-10-19

## 2022-10-19 ENCOUNTER — HOSPITAL ENCOUNTER (EMERGENCY)
Facility: HOSPITAL | Age: 87
Discharge: HOME | End: 2022-10-19
Attending: EMERGENCY MEDICINE | Admitting: EMERGENCY MEDICINE

## 2022-10-19 VITALS
RESPIRATION RATE: 18 BRPM | HEART RATE: 67 BPM | OXYGEN SATURATION: 97 % | TEMPERATURE: 97.1 F | DIASTOLIC BLOOD PRESSURE: 71 MMHG | SYSTOLIC BLOOD PRESSURE: 132 MMHG

## 2022-10-19 DIAGNOSIS — S61.412A LACERATION OF LEFT HAND WITHOUT FOREIGN BODY, INITIAL ENCOUNTER: ICD-10-CM
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
## 2022-10-19 DIAGNOSIS — S09.90XA CLOSED HEAD INJURY, INITIAL ENCOUNTER: ICD-10-CM

## 2022-10-19 DIAGNOSIS — S51.012A SKIN TEAR OF LEFT ELBOW WITHOUT COMPLICATION, INITIAL ENCOUNTER: Primary | ICD-10-CM

## 2022-10-19 PROCEDURE — 72125 CT NECK SPINE W/O DYE: CPT

## 2022-10-19 PROCEDURE — 99282 EMERGENCY DEPT VISIT SF MDM: CPT

## 2022-10-19 PROCEDURE — 70450 CT HEAD/BRAIN W/O DYE: CPT

## 2022-10-19 RX ADMIN — Medication 5 ML: at 16:38

## 2022-10-19 NOTE — ED PROVIDER NOTES
EMERGENCY DEPARTMENT ENCOUNTER    Room Number:  A02/02  Date of encounter:  10/19/2022  PCP: Dell Molina MD  Historian: Patient      PPE    Patient was placed in face mask in first look. Patient was wearing facemask when I entered the room and throughout our encounter. I wore full protective equipment throughout this patient encounter including a face mask, and gloves. Hand hygiene was performed before donning protective equipment and after removal when leaving the room.        HPI:  Chief Complaint: Fall  A complete HPI/ROS/PMH/PSH/SH/FH are unobtainable due to: Nothing    Context: Lavon Navarro is a 89 y.o. male with a history of hyperlipidemia, HTN, CKD who arrives to the ED via private vehicle from home.  Patient presents with c/o skin tear to lower elbow and laceration to left hand that occurred prior to arrival  Patient states that he was in a chair with wheels, he leaned over to pick a potato chip up when he fell out.  He states he hit his arm on a table that was next to it.  He is unsure if he hit his head however there is bruising noted to his forehead.  Patient states he did not lose consciousness.    Patient denies neck pain, back pain, dizziness, headache.  Patient states that nothing makes the symptoms better and nothing worsens symptoms.    States his tetanus shot is up-to-date      PAST MEDICAL HISTORY  Active Ambulatory Problems     Diagnosis Date Noted   • Bronchitis 11/11/2015   • Cough 11/11/2015   • Allergic urticaria    • Cellulitis of fifth finger, right    • Hypertension    • Rectum bleeding 10/31/2017     Resolved Ambulatory Problems     Diagnosis Date Noted   • No Resolved Ambulatory Problems     Past Medical History:   Diagnosis Date   • Cellulitis of fifth finger, right    • Chronic kidney disease (CKD), stage III (moderate)    • Eye exam, routine 2015   • GERD (gastroesophageal reflux disease)    • Hx of bone density study 11/23/2015   • Hyperlipidemia    • Macular degeneration  (senile) of retina    • Screening for prostate cancer 2012         PAST SURGICAL HISTORY  Past Surgical History:   Procedure Laterality Date   • COLON BIOPSY  12/2011   • COLONOSCOPY  12/14/2011   • COLONOSCOPY N/A 11/9/2017    Procedure: COLONOSCOPY TO CECUM WITH COLD POLYPECTOMY, HOT SNARE POLYPECTOMY, CLIP PLACEMENT X1;  Surgeon: Leelee Bustos MD;  Location: [facility] ENDOSCOPY;  Service:    • CYST REMOVAL      from finger   • FRACTURE SURGERY Left     broken leg   • LUNG BIOPSY     • TONSILLECTOMY           FAMILY HISTORY  Family History   Problem Relation Age of Onset   • Cancer Father         prostate   • Cancer Brother         lung         SOCIAL HISTORY  Social History     Socioeconomic History   • Marital status:    Tobacco Use   • Smoking status: Former   • Smokeless tobacco: Never   Substance and Sexual Activity   • Alcohol use: Yes     Comment: moderate amt   • Drug use: No         ALLERGIES  Cephalosporins, Erythromycin, Keflex [cephalexin], Levofloxacin, Penicillins, and Pneumococcal vaccines        REVIEW OF SYSTEMS  Review of Systems     All systems reviewed and negative except for those discussed in HPI.        PHYSICAL EXAM    ED Triage Vitals   Temp Heart Rate Resp BP SpO2   10/19/22 1509 10/19/22 1509 10/19/22 1509 10/19/22 1637 10/19/22 1509   97.1 °F (36.2 °C) 67 18 143/68 98 %         Physical Exam  Musculoskeletal:        Arms:         Hands:        GENERAL: Well appearing, nontoxic appearing, not distressed  HENT: normocephalic, atraumatic  EYES: no scleral icterus, PERRL  CV: regular rhythm, regular rate, no murmur  RESPIRATORY: normal effort, CTAB  ABDOMEN: soft   MUSCULOSKELETAL: no deformity  NEURO: alert, moves all extremities, follows commands, mental status normal/baseline  SKIN: warm, dry, no rash   Psych: Appropriate mood and affect  Nursing notes and vital signs reviewed      LAB RESULTS  No results found for this or any previous visit (from the past 24 hour(s)).    Ordered  the above labs and independently reviewed the results.      RADIOLOGY  CT Head Without Contrast, CT Cervical Spine Without Contrast    Result Date: 10/19/2022  CT HEAD AND CERVICAL SPINE WITHOUT CONTRAST  HISTORY: Fall out of chair, hit head, headache, neck pain.  COMPARISON: CT head 06/22/2017.   CT HEAD WITHOUT CONTRAST:  FINDINGS: The brain and ventricles are symmetrical. There is no evidence of hemorrhage, hydrocephalus or of abnormal extra-axial fluid. No focal area of decreased attenuation to suggest acute infarction is identified. Areas of decreased attenuation involving the white matter of the hemispheres are noted bilaterally consistent with mild small vessel ischemic disease, unchanged. Bone windows showed no evidence of a calvarial fracture. Moderate vascular calcification is noted.      There is no evidence of fracture, intracranial hemorrhage, acute infarction or contusion.   CT EXAMINATION OF THE CERVICAL SPINE WITHOUT CONTRAST:  FINDINGS: There is a grade 1 anterolisthesis of C5 on C6 estimated to be 3 mm. Moderate-to-severe loss of disc height is noted at C3-4 and at C6-7.  There is no evidence of prevertebral edema or fracture.  C2-3: There is no evidence of disc bulge or herniation.  C3-4: A broad-based disc osteophyte complex is present which is more prominent to the left. Moderate-to-severe foraminal stenosis is present on the left secondary to loss of disc height and extension of a disc osteophyte complex into the neural foramen.  C4-5: A broad-based disc osteophyte complex is present which results in mild flattening of the ventral surface of the thecal sac. Mild-to-moderate foraminal stenosis is present on the left secondary to uncovertebral degenerative disease.  C5-6: A mild central broad-based disc osteophyte complex is present with no evidence of herniation. Mild-to-moderate facet degenerative disease is present on the right.  C6-7: A mild central disc osteophyte complex is present with no  evidence of herniation. Mild foraminal stenosis is present bilaterally secondary to extension of a disc osteophyte complex into the neural foramen.  C7-T1: There is no evidence of disc bulge or herniation.  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 See above.    Radiation dose reduction techniques were utilized, including automated exposure control and exposure modulation based on body size.         I ordered the above noted radiological studies and viewed the images on the PACS system.         MEDICAL RECORD REVIEW        PROCEDURES    Laceration Repair    Date/Time: 10/19/2022 5:32 PM  Performed by: Ashlie Gutierrez APRN  Authorized by: Elton Snider MD     Consent:     Consent obtained:  Verbal    Consent given by:  Patient    Risks discussed:  Infection and pain    Alternatives discussed:  No treatment  Universal protocol:     Procedure explained and questions answered to patient or proxy's satisfaction: yes      Patient identity confirmed:  Verbally with patient  Anesthesia:     Anesthesia method:  Topical application    Topical anesthetic:  LET  Laceration details:     Location:  Shoulder/arm    Shoulder/arm location:  L elbow    Length (cm):  6  Exploration:     Wound exploration: entire depth of wound visualized    Treatment:     Area cleansed with:  Saline    Amount of cleaning:  Extensive    Irrigation solution:  Sterile saline    Irrigation method:  Pressure wash  Skin repair:     Repair method:  Steri-Strips    Number of Steri-Strips:  10  Approximation:     Approximation:  Loose  Repair type:     Repair type:  Simple  Post-procedure details:     Dressing:  Non-adherent dressing and bulky dressing    Procedure completion:  Tolerated well, no immediate complications  Laceration Repair    Date/Time: 10/19/2022 5:36 PM  Performed by: Ashlie Gutierrez APRN  Authorized by: Elton Snider MD     Consent:      Consent obtained:  Verbal    Consent given by:  Patient    Risks, benefits, and alternatives were discussed: yes      Risks discussed:  Infection and pain    Alternatives discussed:  No treatment  Universal protocol:     Procedure explained and questions answered to patient or proxy's satisfaction: yes      Patient identity confirmed:  Verbally with patient  Anesthesia:     Anesthesia method:  Topical application    Topical anesthetic:  LET  Laceration details:     Location:  Hand    Hand location:  L hand, dorsum    Length (cm):  8  Pre-procedure details:     Preparation:  Patient was prepped and draped in usual sterile fashion  Exploration:     Hemostasis achieved with:  Epinephrine    Wound exploration: entire depth of wound visualized    Treatment:     Area cleansed with:  Saline    Amount of cleaning:  Standard    Irrigation solution:  Sterile saline    Irrigation method:  Pressure wash  Skin repair:     Repair method:  Sutures    Suture size:  5-0    Suture material:  Nylon    Suture technique:  Simple interrupted    Number of sutures:  16  Approximation:     Approximation:  Close  Repair type:     Repair type:  Complex  Post-procedure details:     Dressing:  Antibiotic ointment, non-adherent dressing and bulky dressing    Procedure completion:  Tolerated well, no immediate complications            DIFFERENTIAL DIAGNOSIS  Differential diagnosis include but are not limited to the following: Skin tear, head injury, laceration      PROGRESS, DATA ANALYSIS, CONSULTS, AND MEDICAL DECISION MAKING        ED Course as of 10/19/22 1937   Wed Oct 19, 2022   1608 Patient is a 89-year-old who presents today after a fall resulting in skin tear to the left elbow and laceration to left hand. Patient appears to have hit his head, CT of head has been ordered to r/o intracranial abnormality. Patient has no neck pain, however, due to his age and fall out of chair, will order a CT C spine to r/o fracture or bony abnormality.   [MS]   1759 Reviewed pt's history and workup with Dr. Snider.  After a bedside evaluation, he agrees with the plan of care.     [MS]   1840 Patient and granddaughter updated on CT head results which showed no acute abnormalities along with a CT cervical spine.  Wound care, signs and symptoms of infection and close follow-up for the skin tear and laceration were discussed in detail with patient and granddaughter.  Strict return to ER precautions provided.  She and patient verbalized understanding and are agreeable to this plan. [MS]      ED Course User Index  [MS] Ashlie Gutierrez, MAGEN     Discussed plan for discharge, as there is no emergent indication for admission. Pt/family is agreeable and understands need for follow up and repeat testing.  Pt is aware that discharge does not mean that nothing is wrong but it indicates no emergency is present that requires admission and they must continue care with follow-up as given below or physician of their choice.   Patient/Family voiced understanding of above instructions.  Patient discharged in stable condition.    DIAGNOSIS  Final diagnoses:   Skin tear of left elbow without complication, initial encounter   Laceration of left hand without foreign body, initial encounter   Closed head injury, initial encounter   Fall, initial encounter       FOLLOW UP   Dell Molina MD  [address] [facility]  [address]  [facility] [address]  [phone]    Call in 1 day      [facility] [address] [address]  [phone]  Call in 1 day        RX     Medication List      No changes were made to your prescriptions during this visit.           MEDICATIONS GIVEN IN ED    Medications   Lido-EPINEPHrine-Tetracaine 4-0.18-0.5 % gel 5 mL (5 mL Topical Given 10/19/22 2222)           COURSE & MEDICAL DECISION MAKING  Any/All labs and Any/All Imaging studies that were ordered were reviewed and are noted above.  Results were  reviewed/discussed with the patient and they were also made aware of online access.    Pt also made aware that some labs, such as cultures, will not be resulted during ER visit and followup with PMD is necessary.        Ashlie Gutierrez, APRN  10/19/22 1937

## 2022-10-19 NOTE — DISCHARGE INSTRUCTIONS
Continue current home medications  Keep dressing in place for 24 to 48 hours, take off, clean with soap and water, apply antibiotic ointment and recover  Monitor areas for signs of infection including redness, swelling, fever, chills  Elevate hand is much as possible for the next 24 to 48 hours  Follow-up with the wound care center, call tomorrow to schedule an appointment  Stitches should come out in 10 to 12 days  Return to the ER for fever, chills, signs of infection, pain, headache, nausea, vomiting or any new or worsening symptoms

## 2022-10-19 NOTE — ED NOTES
Both wounds irrigated with sterile water prior to application of steri strips and sutures. Vaseline guaze applied after sutures and steri strips and wrapped in kerlex.

## 2022-10-19 NOTE — ED TRIAGE NOTES
Chief Complaint   Patient presents with    Hand Injury    Elbow Injury     Patient confused at baseline. Family friend who brought patient unsure about medical history. Patient denies hitting head or blood thinners. Laceration noted on left hand and elbow. Patient states he bent over to pick something up and fell, hitting his elbow and hand on table.     Patient was placed in face mask during first look triage.  Patient was wearing a face mask throughout encounter.  I wore personal protective equipment throughout the encounter.  Hand hygiene was performed before and after patient encounter.

## 2022-10-19 NOTE — ED PROVIDER NOTES
MD ATTESTATION NOTE    The VICKY and I have discussed this patient's history, physical exam, and treatment plan.  I have reviewed the documentation and personally had a face to face interaction with the patient. I affirm the documentation and agree with the treatment and plan.  The attached note describes my personal findings.      I provided a substantive portion of the care of the patient.  I personally performed the physical exam in its entirety, and below are my findings.  For this patient encounter, the patient wore surgical mask, I wore full protective PPE including N95 and eye protection.      Brief HPI: Patient presents to the ED after falling out of a chair.  He was sitting in a chair with wheels when he leaned over to pick something off the floor and fell.  He landed on his left arm and sustained a skin tear to his left forearm.  Denies hitting his head, loss consciousness, neck pain, back pain, or numbness/tingling/weakness in his extremities.  He is not on anticoagulants.    PHYSICAL EXAM  ED Triage Vitals   Temp Heart Rate Resp BP SpO2   10/19/22 1509 10/19/22 1509 10/19/22 1509 10/19/22 1637 10/19/22 1509   97.1 °F (36.2 °C) 67 18 143/68 98 %      Temp src Heart Rate Source Patient Position BP Location FiO2 (%)   10/19/22 1509 10/19/22 1509 -- -- --   Tympanic Monitor            GENERAL: Awake and alert.  Well-developed, well-nourished elderly male.  Resting comfortably in no acute distress  HENT: NCAT nares patent  EYES: no scleral icterus  CV: regular rhythm, normal rate  RESPIRATORY: normal effort, clear to auscultation bilaterally  ABDOMEN: soft, nontender  MUSCULOSKELETAL: no bony tenderness of the extremities, full range of motion in all extremities  NEURO: alert, moves all extremities, follows commands  PSYCH:  calm, cooperative  SKIN: There is a laceration on the dorsal aspect of the left hand.  There is a skin tear on the left elbow.    Vital signs and nursing notes reviewed.        Plan: Obtain  imaging studies.  Clean and repair wounds as needed.    Left hand wound has been sutured.  Steri-Strips were applied to the skin tear on the left elbow.  CT of the head and C-spine are negative acute.     Elton Snider MD  10/19/22 2229

## 2022-10-25 ENCOUNTER — HOSPITAL ENCOUNTER (EMERGENCY)
Facility: HOSPITAL | Age: 87
Discharge: HOME | End: 2022-10-25
Attending: EMERGENCY MEDICINE | Admitting: EMERGENCY MEDICINE

## 2022-10-25 VITALS
TEMPERATURE: 97.6 F | OXYGEN SATURATION: 99 % | DIASTOLIC BLOOD PRESSURE: 62 MMHG | RESPIRATION RATE: 16 BRPM | SYSTOLIC BLOOD PRESSURE: 136 MMHG | HEART RATE: 58 BPM

## 2022-10-25 DIAGNOSIS — Z51.89 VISIT FOR WOUND CHECK: Primary | ICD-10-CM

## 2022-10-25 LAB
ALBUMIN SERPL-MCNC: 4.1 G/DL (ref 3.5–5.2)
ALBUMIN/GLOB SERPL: 1.2 G/DL
ALP SERPL-CCNC: 139 U/L (ref 39–117)
ALT SERPL W P-5'-P-CCNC: 8 U/L (ref 1–41)
ANION GAP SERPL CALCULATED.3IONS-SCNC: 9.5 MMOL/L (ref 5–15)
AST SERPL-CCNC: 14 U/L (ref 1–40)
BASOPHILS # BLD AUTO: 0.03 10*3/MM3 (ref 0–0.2)
BASOPHILS NFR BLD AUTO: 0.5 % (ref 0–1.5)
BILIRUB SERPL-MCNC: 0.4 MG/DL (ref 0–1.2)
BUN SERPL-MCNC: 31 MG/DL (ref 8–23)
BUN/CREAT SERPL: 17.3 (ref 7–25)
CALCIUM SPEC-SCNC: 8.8 MG/DL (ref 8.2–9.6)
CHLORIDE SERPL-SCNC: 104 MMOL/L (ref 98–107)
CO2 SERPL-SCNC: 23.5 MMOL/L (ref 22–29)
CREAT SERPL-MCNC: 1.79 MG/DL (ref 0.76–1.27)
DEPRECATED RDW RBC AUTO: 43.8 FL (ref 37–54)
EGFRCR SERPLBLD CKD-EPI 2021: 34 ML/MIN/1.73
EOSINOPHIL # BLD AUTO: 0.21 10*3/MM3 (ref 0–0.4)
EOSINOPHIL NFR BLD AUTO: 3.3 % (ref 0.3–6.2)
ERYTHROCYTE [DISTWIDTH] IN BLOOD BY AUTOMATED COUNT: 13.1 % (ref 12.3–15.4)
GLOBULIN UR ELPH-MCNC: 3.3 GM/DL
GLUCOSE SERPL-MCNC: 119 MG/DL (ref 65–99)
HCT VFR BLD AUTO: 33.1 % (ref 37.5–51)
HGB BLD-MCNC: 10.9 G/DL (ref 13–17.7)
HOLD SPECIMEN: NORMAL
HOLD SPECIMEN: NORMAL
IMM GRANULOCYTES # BLD AUTO: 0.02 10*3/MM3 (ref 0–0.05)
IMM GRANULOCYTES NFR BLD AUTO: 0.3 % (ref 0–0.5)
LYMPHOCYTES # BLD AUTO: 0.51 10*3/MM3 (ref 0.7–3.1)
LYMPHOCYTES NFR BLD AUTO: 8.1 % (ref 19.6–45.3)
MCH RBC QN AUTO: 30 PG (ref 26.6–33)
MCHC RBC AUTO-ENTMCNC: 32.9 G/DL (ref 31.5–35.7)
MCV RBC AUTO: 91.2 FL (ref 79–97)
MONOCYTES # BLD AUTO: 0.47 10*3/MM3 (ref 0.1–0.9)
MONOCYTES NFR BLD AUTO: 7.4 % (ref 5–12)
NEUTROPHILS NFR BLD AUTO: 5.08 10*3/MM3 (ref 1.7–7)
NEUTROPHILS NFR BLD AUTO: 80.4 % (ref 42.7–76)
NRBC BLD AUTO-RTO: 0 /100 WBC (ref 0–0.2)
PLATELET # BLD AUTO: 130 10*3/MM3 (ref 140–450)
PMV BLD AUTO: 9.7 FL (ref 6–12)
POTASSIUM SERPL-SCNC: 4.6 MMOL/L (ref 3.5–5.2)
PROT SERPL-MCNC: 7.4 G/DL (ref 6–8.5)
RBC # BLD AUTO: 3.63 10*6/MM3 (ref 4.14–5.8)
SODIUM SERPL-SCNC: 137 MMOL/L (ref 136–145)
WBC NRBC COR # BLD: 6.32 10*3/MM3 (ref 3.4–10.8)
WHOLE BLOOD HOLD COAG: NORMAL
WHOLE BLOOD HOLD SPECIMEN: NORMAL

## 2022-10-25 PROCEDURE — 36415 COLL VENOUS BLD VENIPUNCTURE: CPT

## 2022-10-25 PROCEDURE — 85025 COMPLETE CBC W/AUTO DIFF WBC: CPT

## 2022-10-25 PROCEDURE — 80053 COMPREHEN METABOLIC PANEL: CPT

## 2022-10-25 PROCEDURE — 99283 EMERGENCY DEPT VISIT LOW MDM: CPT

## 2023-09-18 ENCOUNTER — APPOINTMENT (OUTPATIENT)
Dept: GENERAL RADIOLOGY | Facility: HOSPITAL | Age: 88
End: 2023-09-18
Payer: MEDICARE

## 2023-09-18 ENCOUNTER — HOSPITAL ENCOUNTER (EMERGENCY)
Facility: HOSPITAL | Age: 88
Discharge: HOME | End: 2023-09-18
Attending: EMERGENCY MEDICINE | Admitting: EMERGENCY MEDICINE
Payer: MEDICARE

## 2023-09-18 ENCOUNTER — APPOINTMENT (OUTPATIENT)
Dept: CT IMAGING | Facility: HOSPITAL | Age: 88
End: 2023-09-18
Payer: MEDICARE

## 2023-09-18 VITALS
HEART RATE: 74 BPM | OXYGEN SATURATION: 95 % | BODY MASS INDEX: 33.87 KG/M2 | DIASTOLIC BLOOD PRESSURE: 87 MMHG | SYSTOLIC BLOOD PRESSURE: 156 MMHG | WEIGHT: 236.6 LBS | HEIGHT: 70 IN | TEMPERATURE: 97.9 F | RESPIRATION RATE: 17 BRPM

## 2023-09-18 DIAGNOSIS — S09.90XA CLOSED HEAD INJURY, INITIAL ENCOUNTER: Primary | ICD-10-CM

## 2023-09-18 DIAGNOSIS — T14.8XXA AVULSION FRACTURE: ICD-10-CM

## 2023-09-18 PROCEDURE — 73030 X-RAY EXAM OF SHOULDER: CPT

## 2023-09-18 PROCEDURE — 99284 EMERGENCY DEPT VISIT MOD MDM: CPT

## 2023-09-18 PROCEDURE — 72125 CT NECK SPINE W/O DYE: CPT

## 2023-09-18 PROCEDURE — 70450 CT HEAD/BRAIN W/O DYE: CPT

## 2023-09-18 NOTE — ED TRIAGE NOTES
Patient has fallen twice today, he tripped and hit his head both times. He is not in any pain at this time- he does have a hematoma on his head. Patient denies loc and no blood thinners at this time.

## 2023-09-18 NOTE — ED PROVIDER NOTES
EMERGENCY DEPARTMENT ENCOUNTER    Room Number:  33/33  PCP: Migue Woo APRN      HPI:  Chief Complaint: Fall  A complete HPI/ROS/PMH/PSH/SH/FH are unobtainable due to: None  Context: Lavon Navarro is a 89 y.o. male who presents to the ED c/o fall.  This was witnessed by his daughter.  He tripped today.  He fell and broke his fall.  He hit his head on concrete.  He is on no blood thinners.  Complains of mild left shoulder pain.  He had a skin tear to his right elbow that was treated at the retina specialist where he fell.  He denies having pain elsewhere.          PAST MEDICAL HISTORY  Active Ambulatory Problems     Diagnosis Date Noted    Bronchitis 11/11/2015    Cough 11/11/2015    Allergic urticaria     Cellulitis of fifth finger, right     Hypertension     Rectum bleeding 10/31/2017     Resolved Ambulatory Problems     Diagnosis Date Noted    No Resolved Ambulatory Problems     Past Medical History:   Diagnosis Date    Cellulitis of fifth finger, right     Chronic kidney disease (CKD), stage III (moderate)     Eye exam, routine 2015    GERD (gastroesophageal reflux disease)     Hx of bone density study 11/23/2015    Hyperlipidemia     Macular degeneration (senile) of retina     Screening for prostate cancer 2012         PAST SURGICAL HISTORY  Past Surgical History:   Procedure Laterality Date    COLON BIOPSY  12/2011    COLONOSCOPY  12/14/2011    COLONOSCOPY N/A 11/9/2017    Procedure: COLONOSCOPY TO CECUM WITH COLD POLYPECTOMY, HOT SNARE POLYPECTOMY, CLIP PLACEMENT X1;  Surgeon: Leelee Bustos MD;  Location: [facility] ENDOSCOPY;  Service:     CYST REMOVAL      from finger    FRACTURE SURGERY Left     broken leg    LUNG BIOPSY      TONSILLECTOMY           FAMILY HISTORY  Family History   Problem Relation Age of Onset    Cancer Father         prostate    Cancer Brother         lung         SOCIAL HISTORY  Social History     Socioeconomic History    Marital status:    Tobacco Use    Smoking status:  Former    Smokeless tobacco: Never   Vaping Use    Vaping Use: Never used   Substance and Sexual Activity    Alcohol use: Yes     Comment: moderate amt    Drug use: No    Sexual activity: Not Currently         ALLERGIES  Cephalosporins, Erythromycin, Keflex [cephalexin], Levofloxacin, Penicillins, and Pneumococcal vaccines        REVIEW OF SYSTEMS  Review of Systems     All systems reviewed and negative except for those discussed in HPI.       PHYSICAL EXAM  ED Triage Vitals [09/18/23 1645]   Temp Heart Rate Resp BP SpO2   97.9 °F (36.6 °C) 82 16 -- 95 %      Temp src Heart Rate Source Patient Position BP Location FiO2 (%)   Tympanic Monitor -- -- --       Physical Exam      GENERAL: no acute distress  HENT: nares patent  EYES: no scleral icterus  CV: regular rhythm, normal rate  RESPIRATORY: normal effort  ABDOMEN: soft, nontender  MUSCULOSKELETAL: no deformity, no C/C/L-spine tenderness, minimal pain to passive range of motion of the left shoulder, full range of motion to the right elbow including flexion, extension, supination, pronation.  NEURO: alert, moves all extremities, follows commands  PSYCH:  calm, cooperative  SKIN: warm, dry    Vital signs and nursing notes reviewed.          LAB RESULTS  No results found for this or any previous visit (from the past 24 hour(s)).    Ordered the above labs and reviewed the results.        RADIOLOGY  XR Shoulder 2+ View Left    Result Date: 9/18/2023  XR SHOULDER 2+ VW LEFT-  INDICATIONS: Pain.  TECHNIQUE: 3 VIEWS OF THE LEFT SHOULDER  COMPARISON: None available  FINDINGS:  Mild hypertrophic degenerative changes seen at the glenohumeral and acromioclavicular articulation. There appears to be a small avulsion fracture fragment along the upper margin of the distal end of the clavicle, measuring about 5 mm x 2 mm. No other evidence of fracture is identified. No dislocation. Follow-up/further evaluation can be obtained as indications persist.        As described.    This  report was finalized on 9/18/2023 5:58 PM by Dr. Naukl Laureano M.D.      CT Head Without Contrast    Result Date: 9/18/2023  CT OF THE BRAIN WITHOUT CONTRAST 09/18/2023  HISTORY: Fell. Head injury.  TECHNIQUE: Axial images were obtained through the brain without intravenous contrast.  FINDINGS: There is moderate diffuse atrophy. There is decreased attenuation of the periventricular white matter bilaterally consistent with small vessel white matter ischemic disease. There is no evidence of acute infarction, hemorrhage, midline shift or mass effect. Bilateral basal ganglia calcifications are seen. No skull fractures are seen. A left parietal scalp hematoma is seen.      1. No acute intracranial process identified.  Radiation dose reduction techniques were utilized, including automated exposure control and exposure modulation based on body size.        CT Cervical Spine Without Contrast    Result Date: 9/18/2023  CT CERVICAL SPINE WO CONTRAST-  INDICATIONS: Trauma. Radiation dose reduction techniques were utilized, including automated exposure control and exposure modulation based on body size.  TECHNIQUE: NONCONTRAST CT OF THE CERVICAL SPINE  COMPARISON: None available  FINDINGS:  No acute fracture is identified. Probable bone island in the dens.  Mild anterolisthesis of C2 on C3, C4 on C5, C5 on C6.  Facet and uncovertebral joint hypertrophy contribute to neuroforaminal narrowing, more prominent on the right at C3/4, and on the left at C3/4, C4/5, C6/7.  Carotid arterial calcifications are present.       No acute cervical fracture identified. Degenerative changes in the cervical spine. For further evaluation, if clinically indicated, MRI could be considered.    This report was finalized on 9/18/2023 6:21 PM by Dr. Nakul Laureano M.D.       Ordered the above noted radiological studies. Reviewed by me in PACS.          PROCEDURES  Procedures          MEDICATIONS GIVEN IN ER  Medications - No data to  display      MEDICAL DECISION MAKING, PROGRESS, and CONSULTS    Discussion below represents my analysis of pertinent findings related to patient's condition, differential diagnosis, treatment plan and final disposition.      Orders placed during this visit:  Orders Placed This Encounter   Procedures    CT Head Without Contrast    CT Cervical Spine Without Contrast    XR Shoulder 2+ View Left             Differential diagnosis:    Intracranial hemorrhage, C-spine fracture, shoulder fracture, shoulder dislocation    After initial evaluation, I considered the possibility of intracranial hemorrhage.  Therefore, CT head was ordered.          Independent interpretation of labs, radiology studies, and discussions with consultants:         CT head independently interpreted by myself.  No evidence of intracranial hemorrhage.    X-ray shows a very small avulsion fracture to the lateral tip of the left clavicle.  I discussed with the patient and daughter placing him in a sling.  However they prefer not doing this as this would limit his mobility.  He is clinically most reasonable as he really has essentially no pain with the left shoulder.      DIAGNOSIS  Final diagnoses:   Closed head injury, initial encounter   Avulsion fracture to left clavicle         DISPOSITION  DISCHARGE    FOLLOW-UP  [facility] EMERGENCY DEPARTMENT  [address] [facility] [address] [phone]  Go to   If symptoms worsen    Migue Woo, APRN  [address] [id number] [phone]    Schedule an appointment as soon as possible for a visit in 2 days           Medication List      No changes were made to your prescriptions during this visit.             Latest Documented Vital Signs:  As of 18:43 EDT  BP- 156/87 HR- 74 Temp- 97.9 °F (36.6 °C) (Tympanic) O2 sat- 95%      --    Please note that portions of this were completed with a voice recognition program.       Note Disclaimer: At  [facility], we believe that sharing information builds trust and better relationships. You are receiving this note because you are receiving care at [facility] or recently visited. It is possible you will see health information before a provider has talked with you about it. This kind of information can be easy to misunderstand. To help you fully understand what it means for your health, we urge you to discuss this note with your provider.         Topher Alvarado II, MD  09/18/23 5705

## 2024-04-24 ENCOUNTER — HOSPITAL ENCOUNTER (OUTPATIENT)
Facility: HOSPITAL | Age: 89
Discharge: HOME | End: 2024-04-24
Attending: EMERGENCY MEDICINE | Admitting: EMERGENCY MEDICINE
Payer: MEDICARE

## 2024-04-24 ENCOUNTER — APPOINTMENT (OUTPATIENT)
Dept: GENERAL RADIOLOGY | Facility: HOSPITAL | Age: 89
End: 2024-04-24
Payer: MEDICARE

## 2024-04-24 VITALS
OXYGEN SATURATION: 99 % | RESPIRATION RATE: 16 BRPM | BODY MASS INDEX: 31.5 KG/M2 | SYSTOLIC BLOOD PRESSURE: 129 MMHG | WEIGHT: 220 LBS | HEART RATE: 80 BPM | DIASTOLIC BLOOD PRESSURE: 70 MMHG | TEMPERATURE: 98.4 F | HEIGHT: 70 IN

## 2024-04-24 DIAGNOSIS — L03.114 CELLULITIS OF LEFT FOREARM: Primary | ICD-10-CM

## 2024-04-24 PROCEDURE — 87147 CULTURE TYPE IMMUNOLOGIC: CPT | Performed by: EMERGENCY MEDICINE

## 2024-04-24 PROCEDURE — 87186 SC STD MICRODIL/AGAR DIL: CPT | Performed by: EMERGENCY MEDICINE

## 2024-04-24 PROCEDURE — 87070 CULTURE OTHR SPECIMN AEROBIC: CPT | Performed by: EMERGENCY MEDICINE

## 2024-04-24 PROCEDURE — 87205 SMEAR GRAM STAIN: CPT | Performed by: EMERGENCY MEDICINE

## 2024-04-24 PROCEDURE — 87077 CULTURE AEROBIC IDENTIFY: CPT | Performed by: EMERGENCY MEDICINE

## 2024-04-24 PROCEDURE — G0463 HOSPITAL OUTPT CLINIC VISIT: HCPCS | Performed by: EMERGENCY MEDICINE

## 2024-04-24 PROCEDURE — 73090 X-RAY EXAM OF FOREARM: CPT

## 2024-04-24 RX ORDER — DOXYCYCLINE 100 MG/1
100 CAPSULE ORAL ONCE
Status: COMPLETED | OUTPATIENT
Start: 2024-04-24 | End: 2024-04-24

## 2024-04-24 RX ORDER — DOXYCYCLINE 100 MG/1
100 CAPSULE ORAL 2 TIMES DAILY
Qty: 14 CAPSULE | Refills: 0 | Status: SHIPPED | OUTPATIENT
Start: 2024-04-24 | End: 2024-05-01

## 2024-04-24 RX ADMIN — DOXYCYCLINE 100 MG: 100 CAPSULE ORAL at 12:38

## 2024-04-24 NOTE — FSED PROVIDER NOTE
Subjective   History of Present Illness  Patient is a 89-year-old male.  He presents with reported infection of the left forearm.  He states that he fell approximately 2 weeks ago onto the door.  He struck his left dorsal forearm and sustained a skin tear to that area.  The area has become more reddened with some foul-smelling drainage this week.  No documented fever or chills.  Patient denies any other injury.      Review of Systems    Past Medical History:   Diagnosis Date    Allergic urticaria     secondary to Levaquin    Cellulitis of fifth finger, right     tip    Chronic kidney disease (CKD), stage III (moderate)     Eye exam, routine 2015    GERD (gastroesophageal reflux disease)     Hx of bone density study 11/23/2015    Hyperlipidemia     Hypertension     Macular degeneration (senile) of retina     2015    Screening for prostate cancer 2012       Allergies   Allergen Reactions    Cephalosporins Hives    Erythromycin Hives    Keflex [Cephalexin]     Levofloxacin Hives    Penicillins     Pneumococcal Vaccines Itching       Past Surgical History:   Procedure Laterality Date    COLON BIOPSY  12/2011    COLONOSCOPY  12/14/2011    COLONOSCOPY N/A 11/9/2017    Procedure: COLONOSCOPY TO CECUM WITH COLD POLYPECTOMY, HOT SNARE POLYPECTOMY, CLIP PLACEMENT X1;  Surgeon: Leelee Bustos MD;  Location: [facility] ENDOSCOPY;  Service:     CYST REMOVAL      from finger    FRACTURE SURGERY Left     broken leg    LUNG BIOPSY      TONSILLECTOMY         Family History   Problem Relation Age of Onset    Cancer Father         prostate    Cancer Brother         lung       Social History     Socioeconomic History    Marital status:    Tobacco Use    Smoking status: Former    Smokeless tobacco: Never   Vaping Use    Vaping status: Never Used   Substance and Sexual Activity    Alcohol use: Yes     Comment: moderate amt    Drug use: No    Sexual activity: Not Currently           Objective   Physical Exam  General: Patient is awake  "alert.  No acute distress    Musculoskeletal and skin: On the left dorsal proximal forearm there is a 9 cm deep skin tear noted.  There is mild surrounding erythema without ascending lymphangitis.  There is some purulent discharge from the central aspect of the wound.  No involvement of the elbow or wrist.  Full range of motion at the elbow and wrist without tenderness.  Distally the radial nerve pulses are 2+ and equal.  Radial ulnar median nerves intact to motor and sensory testing    Procedures           ED Course      XR Forearm 2 View Left    Result Date: 4/24/2024  Narrative: 2 VIEW LEFT FOREARM  HISTORY: Fell 2 weeks ago. Pain.  FINDINGS: There is no evidence of acute fracture. There is no joint effusion at the elbow.  This report was finalized on 4/24/2024 12:30 PM by Dr. Carlo Fritz M.D on Workstation: [id number]       Medications   doxycycline (MONODOX) capsule 100 mg (100 mg Oral Given 4/24/24 1738)        Wound Care: I cleansed the left forearm utilizing a wound cleanser.  I then soaked the area with a sterile saline 4 x 4.  The ulnar edge of the wound was actually folded under the skin into the wound preventing healing.  This is the area that was having the purulent discharge.  I actually was able to essentially \"unfold the skin\".  I trimmed the ulnar edge of the skin into very healthy tissue.  I thereafter irrigated the entire wound and dressed it with a light coat of triple antibiotic ointment and a nonadherent, absorbable bandage.  A culture likewise was obtained during this process.  Patient tolerated procedure well                           Creatinine Clearance (Cockcroft-Gault Equation) - MDCalc  Calculated on Apr 24 2024 12:16 PM  39 mL/min -> Creatinine clearance, original Cockcroft-Gault  33 mL/min -> Creatinine clearance modified for overweight patient, using adjusted body weight of 84 kg (185 lbs).  28.9-33.1 mL/min -> Note: This range uses IBW and adjusted body weight. Controversy exists " over which form of weight to use.          Medical Decision Making  Problems Addressed:  Cellulitis of left forearm: complicated acute illness or injury    Amount and/or Complexity of Data Reviewed  Labs: ordered.  Radiology: ordered.    Risk  Prescription drug management.        Final diagnoses:   Cellulitis of left forearm       ED Disposition  ED Disposition       ED Disposition   Discharge    Condition   Stable    Comment   --               Migue Woo, APRN  [address] [facility] [address]  [place] [id number]  [phone]    In 1 week      [facility]  [address] [place] [address] [phone]             Medication List        New Prescriptions      doxycycline 100 MG capsule  Commonly known as: MONODOX  Take 1 capsule by mouth 2 (Two) Times a Day for 7 days.               Where to Get Your Medications        These medications were sent to [facility] [address] [place] [address] - [address] [facility] - [phone]  - [phone]   [address] [facility], [facility] [address]      Phone: [phone]   doxycycline 100 MG capsule

## 2024-04-24 NOTE — DISCHARGE INSTRUCTIONS
Today I am making a referral to the wound care clinic.  They should notify you and help set this up.  If you not hear from them within 24 hours also included the number.    Like you to gently clean the area at least once daily and cover with an absorbable dressing.    We will follow the wound culture that was obtained today here in the emergency department.  If we need to change the antibiotic we will notify you within 72 hours.    Return for recheck at any time should the area become increasingly red or swollen    Please read all of the instructions in this handout.  If you receive prescriptions please fill them and take them as directed.  Please call your primary care physician for follow-up appointment in the next 5 to 7 days.  If you do not have a physician you may call the [facility] referral line at [phone].    You may return to the emergency department at any time for any concerns such as worsening symptoms.  If you received a work or school note it will be printed at the back of this packet.

## 2024-04-27 LAB
BACTERIA SPEC AEROBE CULT: ABNORMAL
GRAM STN SPEC: ABNORMAL

## (undated) DEVICE — SINGLE-USE BIOPSY FORCEPS: Brand: RADIAL JAW 4

## (undated) DEVICE — TUBING, SUCTION, 1/4" X 10', STRAIGHT: Brand: MEDLINE

## (undated) DEVICE — CANN NASL CO2 TRULINK W/O2 A/

## (undated) DEVICE — TBG 02 CRUSH RESIST LF CLR 7FT

## (undated) DEVICE — THE SINGLE USE ETRAP – POLYP TRAP IS USED FOR SUCTION RETRIEVAL OF ENDOSCOPICALLY REMOVED POLYPS.: Brand: ETRAP

## (undated) DEVICE — SNAR POLYP SENSATION STDOVL 27 240 BX40

## (undated) DEVICE — PAD GRND REM POLYHESIVE A/ DISP

## (undated) DEVICE — THE TORRENT IRRIGATION SCOPE CONNECTOR IS USED WITH THE TORRENT IRRIGATION TUBING TO PROVIDE IRRIGATION FLUIDS SUCH AS STERILE WATER DURING GASTROINTESTINAL ENDOSCOPIC PROCEDURES WHEN USED IN CONJUNCTION WITH AN IRRIGATION PUMP (OR ELECTROSURGICAL UNIT).: Brand: TORRENT

## (undated) DEVICE — Device: Brand: DEFENDO AIR/WATER/SUCTION AND BIOPSY VALVE